# Patient Record
Sex: FEMALE | Race: WHITE
[De-identification: names, ages, dates, MRNs, and addresses within clinical notes are randomized per-mention and may not be internally consistent; named-entity substitution may affect disease eponyms.]

---

## 2017-05-21 NOTE — NUR
Patient discharged with v/s stable. Written and verbal after care instructions 
given and explained. 

Patient alert, oriented and verbalized understanding of instructions. 
Ambulatory with steady gait. All questions addressed prior to discharge. ID 
band removed. Patient advised to follow up with PMD. Rx of ALBUTEROL 
90MCG/ACTUATION INHALATION, AZITHROMYCIN 250MG PO given. Patient educated on 
indication of medication including possible reaction and side effects. 
Opportunity to ask questions provided and answered.

## 2017-07-01 NOTE — NUR
Patient discharged with v/s stable. Written and verbal after care instructions 
given and explained. 

Patient alert, oriented and verbalized understanding of instructions. 
Ambulatory with steady gait. All questions addressed prior to discharge. ID 
band removed. Patient advised to follow up with PMD. Rx of BENADRYL ALLERGY, 
ALBUTERAL& POLYSPORIN OINTMENT  given. Patient educated on indication of 
medication including possible reaction and side effects. Opportunity to ask 
questions provided and answered.

## 2017-07-01 NOTE — NUR
CONNOR JACKMAN REEVALUATING PT AT BEDSIDE. 

-------------------------------------------------------------------------------

Addendum: 07/01/17 at 1136 by [email or web address]

-------------------------------------------------------------------------------

PT C/O FIBER GLASS IN HEREYES.

-------------------------------------------------------------------------------

Addendum: 07/01/17 at 1145 by [email or web address]

-------------------------------------------------------------------------------

PT STATES CAN SEE CLEARLY BUT IRRIATED WHEN MOVING HER EYES.

## 2017-07-01 NOTE — NUR
Patient appears to be resting comfortably in bed. Vital Signs within normal 
limits. Respirations even and unlabored.WILL CONTINUE TO MONITOR.

## 2017-07-01 NOTE — NUR
-------------------------------------------------------------------------------

            *** Note undone in EDM - 07/01/17 at 0756 by [email or web address] ***            

-------------------------------------------------------------------------------

PT PRESENTS TO ER W/C/O ITCHINESS. PT STATES SHE HAS FIBERGLASS EMBEDDED IN HER 
SKIN FROM 3 MONTHS AGO AND LAST NOC SHE HAD WHITE PARTICLES SEEPING OUT OF HER 
SKIN. HX ECZEMA, ASTHMA.PATIENT PRESENTS TO ED WITH  . PT STATES  . DENIES 
N/V/D; SKIN IS PINK/WARM/DRY; AAOX4 WITH EVEN AND STEADY GAIT; LUNGS CLEAR BL; 
HR EVEN AND REGULAR; PT DENIES ANY FEVER, CP, SOB, OR COUGH AT THIS TIME; 
PATIENT STATES PAIN OF 0/10 AT THIS TIME; VSS; PATIENT POSITIONED FOR COMFORT; 
HOB ELEVATED; BEDRAILS UP X2; BED DOWN. ER MD MADE AWARE OF PT STATUS.

## 2017-07-01 NOTE — NUR
PT'S GIRLFRIEND AT BEDSIDE. PT STATES NO PAIN .Patient appears to be resting 
comfortably in bed. Vital Signs within normal limits. Respirations even and 
unlabored.WILL CONTINUE TO MONITOR.

## 2017-07-01 NOTE — NUR
PT PRESENTS TO ER W/C/O ITCHINESS. PT STATES SHE HAS FIBERGLASS EMBEDDED IN HER 
SKIN FROM 3 MONTHS AGO AND LAST NOC SHE HAD WHITE PARTICLES SEEPING OUT OF HER 
SKIN. HX ECZEMA, ASTHMA. DENIES N/V/D; SKIN IS PINK/WARM/DRY; AAOX4 WITH EVEN 
AND STEADY GAIT; LUNGS CLEAR BL; HR EVEN AND REGULAR; PT DENIES ANY FEVER, CP, 
SOB, OR COUGH AT THIS TIME; PATIENT STATES PAIN OF 8/10 AT THIS TIME; VSS; 
PATIENT POSITIONED FOR COMFORT; HOB ELEVATED; BEDRAILS UP X2; BED DOWN. ER MD 
MADE AWARE OF PT STATUS.
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
## 2017-09-28 NOTE — NUR
PATIENT PRESENTS TO ED WITH VAG PAIN WITH ITCHINESS, FOR A MONTH, WITH 
ABSCESSES ON HER BODY FOR A MONTH TOO. 

PT DENIES N/V/D; SKIN IS PINK/WARM/DRY; AAOX4 WITH EVEN AND STEADY GAIT; LUNGS 
CLEAR BL; HR EVEN AND REGULAR; PT DENIES ANY FEVER, CP, SOB, OR COUGH AT THIS 
TIME; PATIENT STATES PAIN OF 7/10 AT THIS TIME; VSS; PATIENT POSITIONED FOR 
COMFORT; HOB ELEVATED; BEDRAILS UP X2; BED DOWN. ER MD MADE AWARE OF PT STATUS.

## 2017-09-29 NOTE — NUR
Patient discharged with v/s stable. Written and verbal after care instructions 
given and explained. 

Patient alert, oriented and verbalized understanding of instructions. 
Ambulatory with steady gait. All questions addressed prior to discharge. ID 
band removed. Patient advised to follow up with PMD. Rx of ibuprofen and medrol 
dosepak given. Patient educated on indication of medication including possible 
reaction and side effects. Opportunity to ask questions provided and answered.

## 2019-03-18 ENCOUNTER — HOSPITAL ENCOUNTER (EMERGENCY)
Dept: HOSPITAL 26 - MED | Age: 24
Discharge: LEFT BEFORE DISCHARGE | End: 2019-03-18
Payer: SELF-PAY

## 2019-03-18 VITALS — HEIGHT: 72 IN | BODY MASS INDEX: 23.7 KG/M2 | WEIGHT: 175 LBS

## 2019-03-18 VITALS — SYSTOLIC BLOOD PRESSURE: 113 MMHG | DIASTOLIC BLOOD PRESSURE: 61 MMHG

## 2019-03-18 VITALS — DIASTOLIC BLOOD PRESSURE: 79 MMHG | SYSTOLIC BLOOD PRESSURE: 132 MMHG

## 2019-03-18 DIAGNOSIS — M79.672: Primary | ICD-10-CM

## 2019-03-18 DIAGNOSIS — Z53.21: ICD-10-CM

## 2022-06-14 ENCOUNTER — HOSPITAL ENCOUNTER (EMERGENCY)
Dept: HOSPITAL 26 - MED | Age: 27
Discharge: HOME | End: 2022-06-14
Payer: COMMERCIAL

## 2022-06-14 VITALS — BODY MASS INDEX: 22.4 KG/M2 | WEIGHT: 160 LBS | HEIGHT: 71 IN

## 2022-06-14 VITALS — SYSTOLIC BLOOD PRESSURE: 119 MMHG | DIASTOLIC BLOOD PRESSURE: 71 MMHG

## 2022-06-14 VITALS — DIASTOLIC BLOOD PRESSURE: 64 MMHG | SYSTOLIC BLOOD PRESSURE: 125 MMHG

## 2022-06-14 DIAGNOSIS — Z79.899: ICD-10-CM

## 2022-06-14 DIAGNOSIS — G89.29: ICD-10-CM

## 2022-06-14 DIAGNOSIS — J45.909: ICD-10-CM

## 2022-06-14 DIAGNOSIS — M79.671: Primary | ICD-10-CM

## 2022-06-14 NOTE — NUR
Patient discharged with v/s stable. Written and verbal after care instructions 
given and explained. 

Patient alert, oriented and verbalized understanding of instructions. 
Ambulatory with steady gait. All questions addressed prior to discharge. ID 
band removed. Patient advised to follow up with PMD. Patient educated on 
indication of medication including possible reaction and side effects. 
Opportunity to ask questions provided and answered.

## 2022-06-14 NOTE — NUR
walked in c/o r foot pain onset 3 days while playing basketball. denies fall or 
injury. aaox3, ambulatory, vitals stable.